# Patient Record
Sex: FEMALE | Race: WHITE | Employment: OTHER | URBAN - METROPOLITAN AREA
[De-identification: names, ages, dates, MRNs, and addresses within clinical notes are randomized per-mention and may not be internally consistent; named-entity substitution may affect disease eponyms.]

---

## 2017-08-31 PROCEDURE — 81001 URINALYSIS AUTO W/SCOPE: CPT | Performed by: INTERNAL MEDICINE

## 2017-08-31 PROCEDURE — 87086 URINE CULTURE/COLONY COUNT: CPT | Performed by: INTERNAL MEDICINE

## 2018-08-31 PROCEDURE — 81003 URINALYSIS AUTO W/O SCOPE: CPT | Performed by: INTERNAL MEDICINE

## 2018-10-30 PROCEDURE — 88304 TISSUE EXAM BY PATHOLOGIST: CPT | Performed by: SURGERY

## 2019-07-11 ENCOUNTER — NURSE ONLY (OUTPATIENT)
Dept: HEMATOLOGY/ONCOLOGY | Facility: HOSPITAL | Age: 74
End: 2019-07-11
Attending: GENETIC COUNSELOR, MS
Payer: MEDICARE

## 2019-07-11 ENCOUNTER — GENETICS ENCOUNTER (OUTPATIENT)
Dept: GENETICS | Facility: HOSPITAL | Age: 74
End: 2019-07-11
Attending: GENETIC COUNSELOR, MS
Payer: MEDICARE

## 2019-07-11 DIAGNOSIS — Z84.81 FAMILY HISTORY OF BRCA2 GENE POSITIVE: Primary | ICD-10-CM

## 2019-07-11 DIAGNOSIS — Z80.3 FAMILY HISTORY OF MALIGNANT NEOPLASM OF BREAST: ICD-10-CM

## 2019-07-11 PROCEDURE — 36415 COLL VENOUS BLD VENIPUNCTURE: CPT

## 2019-07-11 PROCEDURE — 96040 MEDICAL GENETICS COUNSELING EA 30 MIN: CPT | Performed by: GENETIC COUNSELOR, MS

## 2019-07-11 NOTE — PROGRESS NOTES
Reason for visit: Mrs. Crobin Kessler is a 44-year-old woman whose daughter was found to be positive for a BRCA2 mutation. Other family members are considering the option of genetic testing for this familial mutation.  She is now interested in pursuing genetic renae 2 years ago and was normal.  She does not have any history of breast biopsies or breast-related complaints. Summary:   We discussed hereditary breast cancer with . Vincenzo Joiner because of her family history.   Most breast and/or ovarian cancer is not heredit psychological/emotional well-being. Mutations in the genes BRCA1 and BRCA2 account for most (but not all) cases of hereditary breast cancer.   Mutations in other genes have also been associated with an increased risk for breast cancer - but mutations in th reviewed that life insurance, long term healthcare and disability benefits are not covered by these laws, and therefore, should she be interested in obtaining coverage for any of these, she should do this prior to the testing.   Given the known familial [facility]

## 2019-07-22 ENCOUNTER — TELEPHONE (OUTPATIENT)
Dept: GENETICS | Facility: HOSPITAL | Age: 74
End: 2019-07-22

## 2019-07-22 NOTE — TELEPHONE ENCOUNTER
Reported negative genetic testing results to Ken Sesay over phone. Testing for the familial BRCA2 mutation was performed by [facility], which is also the lab that tested her daughter.  She was relieved to hear this information and we discussed that she does not n

## 2020-09-24 PROBLEM — M75.01 ADHESIVE CAPSULITIS OF RIGHT SHOULDER: Status: ACTIVE | Noted: 2020-09-24

## 2021-03-15 DIAGNOSIS — Z23 NEED FOR VACCINATION: ICD-10-CM

## 2021-03-17 ENCOUNTER — IMMUNIZATION (OUTPATIENT)
Dept: LAB | Age: 76
End: 2021-03-17
Attending: HOSPITALIST
Payer: MEDICARE

## 2021-03-17 DIAGNOSIS — Z23 NEED FOR VACCINATION: Primary | ICD-10-CM

## 2021-03-17 PROCEDURE — 0001A SARSCOV2 VAC 30MCG/0.3ML IM: CPT

## 2021-04-07 ENCOUNTER — IMMUNIZATION (OUTPATIENT)
Dept: LAB | Age: 76
End: 2021-04-07
Attending: HOSPITALIST
Payer: MEDICARE

## 2021-04-07 DIAGNOSIS — Z23 NEED FOR VACCINATION: Primary | ICD-10-CM

## 2021-04-07 PROCEDURE — 0002A SARSCOV2 VAC 30MCG/0.3ML IM: CPT

## 2021-11-05 ENCOUNTER — IMMUNIZATION (OUTPATIENT)
Dept: LAB | Facility: HOSPITAL | Age: 76
End: 2021-11-05
Attending: EMERGENCY MEDICINE
Payer: MEDICARE

## 2021-11-05 DIAGNOSIS — Z23 NEED FOR VACCINATION: Primary | ICD-10-CM

## 2021-11-05 PROCEDURE — 0004A SARSCOV2 VAC 30MCG/0.3ML IM: CPT

## 2025-02-16 ENCOUNTER — HOSPITAL ENCOUNTER (EMERGENCY)
Facility: HOSPITAL | Age: 80
Discharge: HOME | End: 2025-02-16
Attending: EMERGENCY MEDICINE
Payer: MEDICARE

## 2025-02-16 ENCOUNTER — APPOINTMENT (OUTPATIENT)
Dept: CT IMAGING | Facility: HOSPITAL | Age: 80
End: 2025-02-16
Attending: EMERGENCY MEDICINE
Payer: MEDICARE

## 2025-02-16 VITALS
BODY MASS INDEX: 22 KG/M2 | WEIGHT: 142 LBS | SYSTOLIC BLOOD PRESSURE: 158 MMHG | DIASTOLIC BLOOD PRESSURE: 64 MMHG | TEMPERATURE: 98 F | OXYGEN SATURATION: 99 % | HEART RATE: 51 BPM | RESPIRATION RATE: 18 BRPM

## 2025-02-16 DIAGNOSIS — I10 HYPERTENSION, UNSPECIFIED TYPE: Primary | ICD-10-CM

## 2025-02-16 LAB
ANION GAP SERPL CALC-SCNC: 8 MMOL/L (ref 0–18)
BASOPHILS # BLD AUTO: 0.04 X10(3) UL (ref 0–0.2)
BASOPHILS NFR BLD AUTO: 0.7 %
BUN BLD-MCNC: 14 MG/DL (ref 9–23)
BUN/CREAT SERPL: 18.9 (ref 10–20)
CALCIUM BLD-MCNC: 9.4 MG/DL (ref 8.7–10.4)
CHLORIDE SERPL-SCNC: 106 MMOL/L (ref 98–112)
CO2 SERPL-SCNC: 28 MMOL/L (ref 21–32)
CREAT BLD-MCNC: 0.74 MG/DL
DEPRECATED RDW RBC AUTO: 44.6 FL (ref 35.1–46.3)
EGFRCR SERPLBLD CKD-EPI 2021: 83 ML/MIN/1.73M2 (ref 60–?)
EOSINOPHIL # BLD AUTO: 0.09 X10(3) UL (ref 0–0.7)
EOSINOPHIL NFR BLD AUTO: 1.5 %
ERYTHROCYTE [DISTWIDTH] IN BLOOD BY AUTOMATED COUNT: 11.9 % (ref 11–15)
GLUCOSE BLD-MCNC: 102 MG/DL (ref 70–99)
HCT VFR BLD AUTO: 40.2 %
HGB BLD-MCNC: 13 G/DL
IMM GRANULOCYTES # BLD AUTO: 0.02 X10(3) UL (ref 0–1)
IMM GRANULOCYTES NFR BLD: 0.3 %
LYMPHOCYTES # BLD AUTO: 1.67 X10(3) UL (ref 1–4)
LYMPHOCYTES NFR BLD AUTO: 27.2 %
MCH RBC QN AUTO: 32.5 PG (ref 26–34)
MCHC RBC AUTO-ENTMCNC: 32.3 G/DL (ref 31–37)
MCV RBC AUTO: 100.5 FL
MONOCYTES # BLD AUTO: 0.44 X10(3) UL (ref 0.1–1)
MONOCYTES NFR BLD AUTO: 7.2 %
NEUTROPHILS # BLD AUTO: 3.88 X10 (3) UL (ref 1.5–7.7)
NEUTROPHILS # BLD AUTO: 3.88 X10(3) UL (ref 1.5–7.7)
NEUTROPHILS NFR BLD AUTO: 63.1 %
OSMOLALITY SERPL CALC.SUM OF ELEC: 295 MOSM/KG (ref 275–295)
PLATELET # BLD AUTO: 180 10(3)UL (ref 150–450)
POTASSIUM SERPL-SCNC: 4.3 MMOL/L (ref 3.5–5.1)
RBC # BLD AUTO: 4 X10(6)UL
SODIUM SERPL-SCNC: 142 MMOL/L (ref 136–145)
TROPONIN I SERPL HS-MCNC: 5 NG/L
WBC # BLD AUTO: 6.1 X10(3) UL (ref 4–11)

## 2025-02-16 PROCEDURE — 84484 ASSAY OF TROPONIN QUANT: CPT | Performed by: EMERGENCY MEDICINE

## 2025-02-16 PROCEDURE — 85025 COMPLETE CBC W/AUTO DIFF WBC: CPT | Performed by: EMERGENCY MEDICINE

## 2025-02-16 PROCEDURE — 99284 EMERGENCY DEPT VISIT MOD MDM: CPT

## 2025-02-16 PROCEDURE — 70450 CT HEAD/BRAIN W/O DYE: CPT | Performed by: EMERGENCY MEDICINE

## 2025-02-16 PROCEDURE — 80048 BASIC METABOLIC PNL TOTAL CA: CPT | Performed by: EMERGENCY MEDICINE

## 2025-02-16 PROCEDURE — 93005 ELECTROCARDIOGRAM TRACING: CPT

## 2025-02-16 PROCEDURE — 36415 COLL VENOUS BLD VENIPUNCTURE: CPT

## 2025-02-16 PROCEDURE — 93010 ELECTROCARDIOGRAM REPORT: CPT

## 2025-02-16 NOTE — ED INITIAL ASSESSMENT (HPI)
HTN and headache onset yesterday    Sys in 190's. Does not take any BP meds    Denies chest pain, sob.

## 2025-02-17 LAB
ATRIAL RATE: 57 BPM
P AXIS: 23 DEGREES
P-R INTERVAL: 126 MS
Q-T INTERVAL: 406 MS
QRS DURATION: 90 MS
QTC CALCULATION (BEZET): 395 MS
R AXIS: 47 DEGREES
T AXIS: 40 DEGREES
VENTRICULAR RATE: 57 BPM

## 2025-02-17 NOTE — ED PROVIDER NOTES
Patient Seen in: [facility]      History     Chief Complaint   Patient presents with    Hypertension     Stated Complaint: Hypertension, dizziness    Subjective:   HPI      79-year-old female presents for evaluation of hypertension.  Patient reports this morning she checked her blood pressure and noted a systolic in the 170s.  She typically takes her blood pressure at least once a day, however had not taken her blood pressure yesterday.  Which she reports yesterday she was in her usual state of health, had helped out clearing snow and had no issues with this.  Today she checked her blood pressure several times after her initial high blood pressure reading and noted blood pressure went up to 190.  Notes mild headache today. She has had intermittent episodes of dizziness.  No vertigo, chest pain or pressure, shortness of breath, focal weakness or numbness.  Not on any blood pressure medication.    Objective:     Past Medical History:    Aortic calcification    on CT    Glaucoma    Dr. Niharika Vickers    Hx of diseases NEC    right breast biopsy,neg    Other and unspecified hyperlipidemia    Personal history of colonic polyps    tubular adenoma    Screening for osteoporosis    refuses any further DEXA scans    Tinnitus              Past Surgical History:   Procedure Laterality Date    Cholecystectomy  10/30/2018    Dr Olivas    Colonoscopy  11/2015    four small polyps (normal tissue), tics, hemorrhoids, repeat 2020 due to previous h/o polyps    Colonoscopy  2004; 11/09-->due 5 years    Adenomatous polyp (Dr. Harrison)    Colonoscopy  11/2015    +polyps; repeat in 5 years (Dr. Hoffman)    Colonoscopy,biopsy N/A 11/19/2015    Procedure: COLONOSCOPY, POSSIBLE BIOPSY, POSSIBLE POLYPECTOMY 93123;  Surgeon: Jos Hoffman MD;  Location: [facility] [place], [facility]    Colonoscopy,remv lesn,snare N/A 11/19/2015    Procedure: COLONOSCOPY, POSSIBLE BIOPSY, POSSIBLE POLYPECTOMY 47251;  Surgeon:  Jos Hoffman MD;  Location: [facility]    Patient documented not to have experienced any of the following events N/A 11/19/2015    Procedure: COLONOSCOPY, POSSIBLE BIOPSY, POSSIBLE POLYPECTOMY 62311;  Surgeon: Jos Hoffman MD;  Location: [facility]    Patient withough preoperative order for iv antibiotic surgical site infection prophylaxis. N/A 11/19/2015    Procedure: COLONOSCOPY, POSSIBLE BIOPSY, POSSIBLE POLYPECTOMY 59701;  Surgeon: Jos Hoffman MD;  Location: [facility]    Tonsillectomy                  Social History     Socioeconomic History    Marital status:     Number of children: 2   Occupational History    Occupation: retired   Tobacco Use    Smoking status: Never    Smokeless tobacco: Never    Tobacco comments:     Caffeine--none   Vaping Use    Vaping status: Never Used   Substance and Sexual Activity    Alcohol use: Yes     Alcohol/week: 0.0 - 1.0 standard drinks of alcohol     Comment: minimal beer    Drug use: No    Sexual activity: Never   Other Topics Concern     Service No    Blood Transfusions No    Caffeine Concern No    Occupational Exposure No    Hobby Hazards No    Sleep Concern No    Stress Concern No    Weight Concern No    Special Diet No    Back Care No    Exercise Yes     Comment: walking    Bike Helmet No    Seat Belt Yes    Self-Exams Yes   Social History Narrative    , daughter in [place], son passed away from esophagus CA, non-smoker, occasional EtOH, retired                  Physical Exam     ED Triage Vitals [02/16/25 1746]   BP (!) 197/81   Pulse 56   Resp 20   Temp 97.8 °F (36.6 °C)   Temp src    SpO2 100 %   O2 Device None (Room air)       Current Vitals:   Vital Signs  BP: (!) 190/90  Pulse: 55  Resp: 20  Temp: 97.8 °F (36.6 °C)  MAP (mmHg): (!) 116    Oxygen Therapy  SpO2: 100 %  O2 Device: None (Room air)        Physical Exam  Vitals and nursing note reviewed.   Constitutional:       General:  She is not in acute distress.     Appearance: She is well-developed.   HENT:      Head: Normocephalic and atraumatic.   Eyes:      Conjunctiva/sclera: Conjunctivae normal.   Cardiovascular:      Rate and Rhythm: Normal rate and regular rhythm.      Heart sounds: Normal heart sounds.   Pulmonary:      Effort: Pulmonary effort is normal. No respiratory distress.      Breath sounds: Normal breath sounds.   Abdominal:      General: Bowel sounds are normal. There is no distension.      Palpations: Abdomen is soft.      Tenderness: There is no abdominal tenderness. There is no guarding or rebound.   Musculoskeletal:         General: Normal range of motion.      Cervical back: Normal range of motion and neck supple.   Skin:     General: Skin is warm and dry.      Findings: No rash.   Neurological:      General: No focal deficit present.      Mental Status: She is alert and oriented to person, place, and time.      Sensory: No sensory deficit.      Motor: No weakness.      Comments: 5/5 throughout, sensation intact             ED Course     Labs Reviewed   CBC WITH DIFFERENTIAL WITH PLATELET - Abnormal; Notable for the following components:       Result Value    .5 (*)     All other components within normal limits   BASIC METABOLIC PANEL (8) - Abnormal; Notable for the following components:    Glucose 102 (*)     All other components within normal limits   TROPONIN I HIGH SENSITIVITY - Normal     EKG    Rate, intervals and axes as noted on EKG Report.  Rate: 57  Rhythm: Sinus Rhythm  Reading: Sinus bradycardia, intervals within normal limits, no STEMI                Imaging Results Available and Reviewed while in ED:   CT BRAIN OR HEAD (CPT=70450)    (Results Pending)         Vitals:    02/16/25 1746 02/16/25 1830   BP: (!) 197/81 (!) 190/90   Pulse: 56 55   Resp: 20    Temp: 97.8 °F (36.6 °C)    SpO2: 100% 100%   Weight: 64.4 kg      *I personally reviewed and interpreted all ED vitals.         [facility]      Medical  Decision Making  Well-appearing patient with high blood pressure, normal neurovascular exam, otherwise asymptomatic.  Will check labs and CT, and if no acute finding anticipate discharge with outpatient follow-up.  Discussed this with patient and daughter at the bedside as well as return precautions.  Both verbalized understanding of and agreement with this plan.    Amount and/or Complexity of Data Reviewed  External Data Reviewed: labs.     Details: BMP stable compared to labs from 9/15/2021, CBC stable compared to 10/1/2021  Labs: ordered.  Radiology: ordered.  ECG/medicine tests: ordered and independent interpretation performed. Decision-making details documented in ED Course.        Disposition and Plan     Clinical Impression:  1. Hypertension, unspecified type         Disposition:  There is no disposition on file for this visit.  There is no disposition time on file for this visit.    Follow-up:  Isabella Mccall MD  [address] [place] [address] [id number] [phone]    Schedule an appointment as soon as possible for a visit in 1 week(s)  For follow up    We recommend that you schedule follow up care with a primary care provider within the next three months to obtain basic health screening including reassessment of your blood pressure.      Medications Prescribed:  Current Discharge Medication List              Supplementary Documentation:

## 2025-02-17 NOTE — DISCHARGE INSTRUCTIONS
Take Tylenol as needed for pain.    Take blood pressure no more than 1 time a day, write this number down and bring this to your primary care doctor.    See primary care for a follow-up appointment in 1 week.    Return to the ER if you develop worsening symptoms, sudden severe headache, chest pain or pressure, shortness of breath, weakness or numbness on one side of the body, slurred speech, or any emergent concerns.